# Patient Record
Sex: FEMALE | Race: OTHER | HISPANIC OR LATINO | URBAN - METROPOLITAN AREA
[De-identification: names, ages, dates, MRNs, and addresses within clinical notes are randomized per-mention and may not be internally consistent; named-entity substitution may affect disease eponyms.]

---

## 2019-04-24 ENCOUNTER — EMERGENCY (EMERGENCY)
Facility: HOSPITAL | Age: 59
LOS: 1 days | Discharge: HOME | End: 2019-04-24
Attending: EMERGENCY MEDICINE | Admitting: EMERGENCY MEDICINE
Payer: MEDICAID

## 2019-04-24 VITALS
HEART RATE: 71 BPM | RESPIRATION RATE: 18 BRPM | TEMPERATURE: 98 F | SYSTOLIC BLOOD PRESSURE: 141 MMHG | DIASTOLIC BLOOD PRESSURE: 78 MMHG | OXYGEN SATURATION: 96 %

## 2019-04-24 VITALS
HEART RATE: 76 BPM | OXYGEN SATURATION: 97 % | WEIGHT: 145.06 LBS | SYSTOLIC BLOOD PRESSURE: 157 MMHG | RESPIRATION RATE: 17 BRPM | TEMPERATURE: 98 F | DIASTOLIC BLOOD PRESSURE: 89 MMHG

## 2019-04-24 PROCEDURE — 82962 GLUCOSE BLOOD TEST: CPT

## 2019-04-24 PROCEDURE — 99283 EMERGENCY DEPT VISIT LOW MDM: CPT

## 2019-04-24 PROCEDURE — 99284 EMERGENCY DEPT VISIT MOD MDM: CPT

## 2019-04-24 NOTE — ED ADULT TRIAGE NOTE - CHIEF COMPLAINT QUOTE
sent down as RRT. Patient was sitting in chair in the waiting room distraut after being told that her mother had a stroke upstairs.  Per daughter she saw her mother pass out (for about a min), slide down the chair and onto the floor.   on arrival.  Verbalized she is feeling better. Denies pain, chest discomfort, dizziness, palpitations.  Hx HTN.  EKG in progress.

## 2019-04-24 NOTE — ED ADULT NURSE NOTE - OBJECTIVE STATEMENT
Patient was visiting family in hospital and patient received unfavorable news, and patient had a near syncopal episode, and slid down to the floor. hx of HTN, no other c/o voiced. Denies CP, denies LOC, no SOB noted. Denies any aura, no tonic/clonic movements noted. A+O x 4, c/o mild headache. Patient reports having hx of near syncopal episodes when receiving unfavorable news. Pa

## 2019-04-24 NOTE — ED PROVIDER NOTE - OBJECTIVE STATEMENT
58y female h/o HTN presenting after near syncopal episode - pt upstairs visiting ill mother, received upsetting news, and became emotional. Guided to floor by surrounding family. No danilo LOC, no seizure activity. No current symptoms, pt feels well and would like to be DC'd to return to her family.

## 2019-04-24 NOTE — ED ADULT NURSE NOTE - CHPI ED NUR SYMPTOMS NEG
no fever/no decreased eating/drinking/no dizziness/no weakness/no pain/no chills/no vomiting/no nausea/no tingling

## 2019-04-24 NOTE — ED ADULT NURSE NOTE - NSIMPLEMENTINTERV_GEN_ALL_ED
Implemented All Fall Risk Interventions:  [place] to call system. Call bell, personal items and telephone within reach. Instruct patient to call for assistance. Room bathroom lighting operational. Non-slip footwear when patient is off stretcher. Physically safe environment: no spills, clutter or unnecessary equipment. Stretcher in lowest position, wheels locked, appropriate side rails in place. Provide visual cue, wrist band, yellow gown, etc. Monitor gait and stability. Monitor for mental status changes and reorient to person, place, and time. Review medications for side effects contributing to fall risk. Reinforce activity limits and safety measures with patient and family.

## 2019-04-24 NOTE — ED PROVIDER NOTE - CLINICAL SUMMARY MEDICAL DECISION MAKING FREE TEXT BOX
58y female with near syncopal episode after receiving bad news. Asymptomatic at this time. HDS, well appearing. EKG ok. . Pt eager for DC to return to be w her family. Will f/up w PMD as needed.

## 2019-04-28 DIAGNOSIS — R55 SYNCOPE AND COLLAPSE: ICD-10-CM

## 2019-04-28 DIAGNOSIS — I10 ESSENTIAL (PRIMARY) HYPERTENSION: ICD-10-CM
